# Patient Record
Sex: FEMALE | Race: WHITE
[De-identification: names, ages, dates, MRNs, and addresses within clinical notes are randomized per-mention and may not be internally consistent; named-entity substitution may affect disease eponyms.]

---

## 2020-01-06 ENCOUNTER — HOSPITAL ENCOUNTER (OUTPATIENT)
Dept: HOSPITAL 53 - M RAD | Age: 85
End: 2020-01-06
Attending: PHYSICIAN ASSISTANT
Payer: MEDICARE

## 2020-01-06 DIAGNOSIS — R91.1: Primary | ICD-10-CM

## 2020-01-06 DIAGNOSIS — J90: ICD-10-CM

## 2020-01-06 DIAGNOSIS — R91.1: ICD-10-CM

## 2020-01-06 DIAGNOSIS — J47.9: ICD-10-CM

## 2020-01-06 DIAGNOSIS — M51.37: Primary | ICD-10-CM

## 2020-01-06 NOTE — REP
INDICATION:  History of cancer

 

PROCEDURE:  MR lumbar spine.

 

COMPARISON STUDIES:  None.

 

FINDINGS:  There is multilevel degenerative disc disease with loss of disc height

and disc desiccation seen diffusely throughout the lumbar spine.  There is focal

loss of vertebral body height at T11, with wedge compression fracture, greater

than 50% loss of height.  There is mild-to-moderate loss of vertebral body height

at T12.  L1-L3 heights are preserved.  L4 with mild-to-moderate loss of vertebral

height.  L5 with significant chronic compression deformity with greater than 75%

loss of vertebral body height.  The conus ends normally at L1 level.

 

There is a significant canal stenosis at L2-3 through more inferior levels with

impingement of the nerve roots of the cauda quinine.  This is seen at L2-3

through sacral levels.  There is hypointensity at T1 and T2 hypointensity at the

sacral levels, S1 and S2.  On the axial images this is seen within the sacrum

bilaterally adjacent the SI joint medially.

 

At L1-2:  Global disc bulge centered to the left with mild-to-moderate canal

stenosis and mild-to-moderate bilateral foraminal narrowing is greater on the

left.

 

At L2-3:  Global disc bulge with a least moderate canal stenosis and

mild-to-moderate bilateral foraminal narrowing that is greater on the right.

 

At L3-4:  Global disc bulge with at least moderate canal stenosis and moderate

bilateral foraminal narrowing that is greater on the left.

 

At L4-5:  Global disc bulge with moderate-to-severe canal stenosis and moderate

bilateral foraminal narrowing.

 

At L5-S1:  Loss of disc height with mild-to-moderate canal stenosis and moderate

bilateral foraminal narrowing appears slightly greater on the right.

 

Left renal lesion better seen on the recent CT scan.

 

IMPRESSION:

 

   1. Multilevel degenerative disc disease as described.  Significant chronic

   compression deformities at T11 and L5.

   2. Diffuse canal stenosis L2-3 through sacral levels.

   3. T1 and T2 hypointensity in S1 and S2 within the sacrum bilaterally,

   medially adjacent to SI joint is highly suggestive of metastatic disease.  No

   fracture.

 

 

 

Electronically Signed by

Manjinder Garcia MD 01/06/2020 01:51 P

## 2020-01-06 NOTE — REP
CT chest without contrast:

 

History:  Neoplasm of uncertain behavior.  The patient gives a history of a

previous carcinoma of the cervix and COPD.

 

Comparison chest CT study October 25, 2016.

 

CT findings:  There is an interval finding of osteoporotic wedge compression

fracture deformity at the T11 and T12 levels.  There is approximately 75% loss of

anterior vertebral body height at T11 and 20% loss of anterior vertebral body

height at T12.  There are several healing rib fractures on the right and there is

diffuse osteopenia.  No bony destructive lesion is appreciated.

 

There is fairly extensive bronchiectasis in the right lower lobe.  The

anterobasal segment of the right lower lobe region shows volume loss and

consolidation with bronchiectatic air bronchograms.  There is a small quantity of

right pleural effusion.  No left effusion is seen.  There are mild emphysematous

changes.

 

There are granulomatous calcifications in the spleen.  There appears to be a cyst

in the upper pole left kidney incompletely included in the field of view.  No

adrenal lesion is seen.  No hilar or mediastinal mass or adenopathy is seen.

 

Impression:

 

There is fairly extensive bronchiectasis in the right lower lobe with

consolidation and collapse in the anterobasal region of the right lower lobe and

a small right pleural effusion.  There is a 7 mm noncalcified nodular density in

the right lower lobe .  Interval osteoporotic wedge compression fracture

deformities are noted at T11 and T12.  Mild emphysematous changes.

 

 

Electronically Signed by

Mik Kelly MD 01/06/2020 09:04 A

## 2020-02-12 ENCOUNTER — HOSPITAL ENCOUNTER (OUTPATIENT)
Dept: HOSPITAL 53 - M PLARAD | Age: 85
End: 2020-02-12
Attending: INTERNAL MEDICINE
Payer: MEDICARE

## 2020-02-12 DIAGNOSIS — C53.8: Primary | ICD-10-CM

## 2020-02-12 PROCEDURE — 78815 PET IMAGE W/CT SKULL-THIGH: CPT

## 2020-02-13 NOTE — REP
PET/CT:

 

History: Restaging malignant neoplasm of the cervix.

 

Comparisons: Comparison MRI lumbar spine January 6, 2020.  Comparison chest CT

January 6, 2020. MRI lumbar spine was read as showing abnormal signal intensity

in the sacrum suggestive of metastatic disease.

 

TECHNIQUE:

 

46 minutes following the intravenous injection of a 8.05 mCi dose of F-18 FDG,

three-dimensional PET scintigraphy is acquired from the skull base to the

proximal thighs. Triplanar noncontrast CT scanning is acquired through the same

anatomic range for attenuation correction, and image registration with scan

parameters optimized to minimize radiation exposure to the patient. PET

scintigraphy and CT datasets were fused and displayed on a workstation with

multiplanar and projection display capability.

 

PET/CT Findings: There is atypically extensive diffuse skeletal muscle uptake

throughout the visualized scan field.  This is of uncertain etiology but may well

negatively affect sensitivity for visceral disease detection. I am informed by

the staff that the patient was quite anxious during the period time between the

injection of scan and had to be related to the rest fall.  If she did not follow

the fasting instructions, this could also explain the pattern of uptake in the

muscles.  Diffuse myositis/rhabdomyolysis could account for the skeletal uptake

pattern, possibly fever with shaking chills during the period between the

injection and the scan acquisition.

 

There is no discernible tracer uptake in the 7 mm nodule in the right lower lobe.

There is mildly hypermetabolic uptake in the parenchymal consolidation associated

with bronchiectasis in the right lower lobe, maximum standard uptake value 3.02.

This is chronic by CT.  No other abnormal hypermetabolic uptake is seen within

the thoracic cavity.  There is no evidence of hypermetabolic adenopathy in the

head and neck soft tissues.  No abnormal hypermetabolic uptake is seen within the

abdominal cavity.

 

CT images accompanying the PET scintigraphy demonstrate healing sacral

insufficiency fractures bilaterally.  This may be a delayed complication related

to osteoporosis and post radiation therapy.  There is no suspicious

hypermetabolic uptake to suggest skeletal metastatic disease.  Maximum standard

uptake value within the sacrum is 2.04 on the left and 2.08 on the right.  Also

noted on CT are healing fractures of the inferior pubic rami bilaterally and the

right superior pubic ramus.  There is abnormal sclerosis in the left symphysis

pubis implying healing fracture here as well.  No evident hypermetabolic uptake

is seen in any of these sites.  There is no evidence of lytic metastasis.  There

is osteoporotic wedge compression deformity in the lower thoracic spine and at L5

and L4.  No abnormal skeletal uptake.

 

Impression:

 

Scan quality and sensitivity negatively affected by atypically and diffusely

prominent skeletal muscle uptake of uncertain etiology.  CT images demonstrate

healing sacral insufficiency fractures bilaterally which explains the abnormal

signal intensity on recent MRI.  There are also healing fractures in the inferior

pubic rami bilaterally and the superior pubic ramus on the right.  Probable

healing fracture in the left symphysis.

 

 

Electronically Signed by

Mik Kelly MD 02/13/2020 10:11 A